# Patient Record
Sex: FEMALE | Race: OTHER | Employment: FULL TIME | URBAN - NONMETROPOLITAN AREA
[De-identification: names, ages, dates, MRNs, and addresses within clinical notes are randomized per-mention and may not be internally consistent; named-entity substitution may affect disease eponyms.]

---

## 2020-09-17 ENCOUNTER — HOSPITAL ENCOUNTER (EMERGENCY)
Age: 65
Discharge: HOME | End: 2020-09-17
Attending: FAMILY MEDICINE

## 2020-09-17 VITALS
DIASTOLIC BLOOD PRESSURE: 74 MMHG | HEART RATE: 72 BPM | SYSTOLIC BLOOD PRESSURE: 132 MMHG | RESPIRATION RATE: 16 BRPM | TEMPERATURE: 98.3 F | OXYGEN SATURATION: 99 %

## 2020-09-17 PROCEDURE — 90471 IMMUNIZATION ADMIN: CPT | Performed by: FAMILY MEDICINE

## 2020-09-17 PROCEDURE — 12001 RPR S/N/AX/GEN/TRNK 2.5CM/<: CPT

## 2020-09-17 PROCEDURE — 99282 EMERGENCY DEPT VISIT SF MDM: CPT

## 2020-09-17 PROCEDURE — 2500000003 HC RX 250 WO HCPCS

## 2020-09-17 PROCEDURE — 99283 EMERGENCY DEPT VISIT LOW MDM: CPT

## 2020-09-17 PROCEDURE — 2709999900 HC NON-CHARGEABLE SUPPLY

## 2020-09-17 PROCEDURE — 6360000002 HC RX W HCPCS: Performed by: FAMILY MEDICINE

## 2020-09-17 PROCEDURE — 90715 TDAP VACCINE 7 YRS/> IM: CPT | Performed by: FAMILY MEDICINE

## 2020-09-17 RX ORDER — LIDOCAINE HYDROCHLORIDE 10 MG/ML
5 INJECTION, SOLUTION EPIDURAL; INFILTRATION; INTRACAUDAL; PERINEURAL ONCE
Status: DISCONTINUED | OUTPATIENT
Start: 2020-09-17 | End: 2020-09-17 | Stop reason: HOSPADM

## 2020-09-17 RX ORDER — LIDOCAINE HYDROCHLORIDE 10 MG/ML
INJECTION, SOLUTION INFILTRATION; PERINEURAL
Status: COMPLETED
Start: 2020-09-17 | End: 2020-09-17

## 2020-09-17 RX ADMIN — LIDOCAINE HYDROCHLORIDE 5 ML: 10 INJECTION, SOLUTION INFILTRATION; PERINEURAL at 20:18

## 2020-09-17 RX ADMIN — TETANUS TOXOID, REDUCED DIPHTHERIA TOXOID AND ACELLULAR PERTUSSIS VACCINE, ADSORBED 0.5 ML: 5; 2.5; 8; 8; 2.5 SUSPENSION INTRAMUSCULAR at 20:17

## 2020-09-17 ASSESSMENT — PAIN SCALES - GENERAL: PAINLEVEL_OUTOF10: 0

## 2020-09-17 ASSESSMENT — ENCOUNTER SYMPTOMS
NAUSEA: 0
VOMITING: 0

## 2020-09-18 NOTE — ED NOTES
Tube gauze dressing applied to right 4th finger. Pt instructed on care and discharge instructions via  service. Pt verbalized understanding.       Kayleen Jane RN  09/17/20 2032

## 2020-09-18 NOTE — ED PROVIDER NOTES
[facility]  eMERGENCY dEPARTMENT eNCOUnter          [address] [facility]       Chief Complaint   Patient presents with    Laceration     right 4th finger       Nurses Notes reviewed and I agree except as noted in the HPI. HISTORY OF PRESENT ILLNESS    Kang Gonzalez is a 59 y.o. female who presents with cut to right ring finger. 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 Denies other injuries. Tetanus status unknown. REVIEW OF SYSTEMS     Review of Systems   Constitutional: Positive for activity change. Negative for chills and fever. Gastrointestinal: Negative for nausea and vomiting. Musculoskeletal: Negative for arthralgias and joint swelling. Skin: Positive for wound (right ring finger cut ). Hematological: Does not bruise/bleed easily. Psychiatric/Behavioral: Negative for agitation and behavioral problems. All other systems reviewed and are negative. PAST MEDICAL HISTORY    has no past medical history on file. SURGICAL HISTORY      has no past surgical history on file. CURRENT MEDICATIONS       Previous Medications    No medications on file       ALLERGIES     has No Known Allergies. FAMILY HISTORY     has no family status information on file. family history is not on file. SOCIAL HISTORY      reports that she has never smoked. She has never used smokeless tobacco. She reports that she does not drink alcohol or use drugs. PHYSICAL EXAM     INITIAL VITALS:  temperature is 98.3 °F (36.8 °C). Her blood pressure is 132/74 and her pulse is 72. Her respiration is 16 and oxygen saturation is 99%. Physical Exam  Vitals signs and nursing note reviewed. Constitutional:       General: She is not in acute distress. Musculoskeletal: Normal range of motion. General: Signs of injury present. Skin:     Capillary Refill: Capillary refill takes less than 2 seconds. Comments: Laceration right finger palmar aspect.  2 cm Neurological:      Mental Status: She is alert. Sensory: No sensory deficit. Psychiatric:         Mood and Affect: Mood normal.         Behavior: Behavior normal.         DIFFERENTIAL DIAGNOSIS:   Laceration right ring finger     DIAGNOSTIC RESULTS     EKG: All EKG's are interpreted by the Emergency Department Physician who either signs or Co-signs this chart in the absence of a cardiologist.      RADIOLOGY: non-plain film images(s) such as CT, Ultrasound and MRI are read by the radiologist.      LABS:   Labs Reviewed - No data to display    EMERGENCY DEPARTMENT COURSE:   Vitals:    Vitals:    09/17/20 1950   BP: 132/74   Pulse: 72   Resp: 16   Temp: 98.3 °F (36.8 °C)   SpO2: 99%     Tetanus updated. Patient is Turkmen-speaking. Was able to articulate need for stitches and she voiced understanding. CRITICAL CARE:       CONSULTS:      PROCEDURES:  Patient Name: Selina Hightower   Medical Record Number: [id number]  Date: 9/17/2020   Time: 8:17 PM   Room/Bed: 1/[facility]  Laceration Repair Procedure Note  Indication: Laceration    Procedure: The patient was placed in the appropriate position and anesthesia around the laceration was obtained by infiltration using 1% Lidocaine without epinephrine. The area was then cleansed using Shur-Clens. The laceration was closed with 4-0 Ethilon using interrupted sutures. There were no additional lacerations requiring repair. The wound area was then dressed with a bandage. Total repaired wound length: 2 cm. Other Items: Suture count: 7    The patient tolerated the procedure well. Complications: None    Electronically Signed by: @[email or web address]@    FINAL IMPRESSION      1. Laceration of right ring finger without foreign body without damage to nail, initial encounter          DISPOSITION/PLAN   Home. Care instructions provided. Watch for signs and symptoms of infection which may include redness,discharge,or fever.  Follow up in 7-10 days for suture removal.

## 2020-09-18 NOTE — ED NOTES
Pt and supervisor given discharge instructions. Both verbalized understanding and pt left ambulatory with supervisor. Pt in stable condition.       Comfort Foote RN  09/17/20 2040

## 2020-09-25 ENCOUNTER — HOSPITAL ENCOUNTER (OUTPATIENT)
Dept: GENERAL RADIOLOGY | Age: 65
Discharge: HOME | End: 2020-09-25
Payer: COMMERCIAL

## 2020-09-25 VITALS
SYSTOLIC BLOOD PRESSURE: 176 MMHG | DIASTOLIC BLOOD PRESSURE: 80 MMHG | RESPIRATION RATE: 15 BRPM | HEART RATE: 80 BPM | OXYGEN SATURATION: 97 % | TEMPERATURE: 98.3 F

## 2020-09-25 PROCEDURE — 2709999900 HC NON-CHARGEABLE SUPPLY

## 2020-09-25 ASSESSMENT — PAIN SCALES - GENERAL: PAINLEVEL_OUTOF10: 2

## 2020-09-25 ASSESSMENT — PAIN DESCRIPTION - ORIENTATION: ORIENTATION: RIGHT;OTHER (COMMENT)

## 2020-09-25 ASSESSMENT — PAIN DESCRIPTION - LOCATION: LOCATION: FINGER (COMMENT WHICH ONE)

## 2020-09-25 NOTE — PROGRESS NOTES
Pt presents amb per self for Wound recheck and suture removal.  Pt has 2.5 cm  Laceration legth of rt index finger. Suture line dry, intact.

## 2020-09-25 NOTE — PROGRESS NOTES
Dr Macias Butt in with pt and removed some of the stitches. Left rest in. Cleansed wit alcohol. Steri strips applied across area that stitches where removed. Covered with 2\" uri and tape.

## 2020-09-29 ENCOUNTER — HOSPITAL ENCOUNTER (OUTPATIENT)
Dept: GENERAL RADIOLOGY | Age: 65
Discharge: HOME | End: 2020-09-29

## 2020-09-29 VITALS
HEART RATE: 66 BPM | DIASTOLIC BLOOD PRESSURE: 98 MMHG | TEMPERATURE: 98.1 F | RESPIRATION RATE: 16 BRPM | SYSTOLIC BLOOD PRESSURE: 161 MMHG | OXYGEN SATURATION: 98 %

## 2020-09-29 PROCEDURE — 2709999900 HC NON-CHARGEABLE SUPPLY

## 2020-09-29 PROCEDURE — 99202 OFFICE O/P NEW SF 15 MIN: CPT

## 2020-09-29 PROCEDURE — 6370000000 HC RX 637 (ALT 250 FOR IP)

## 2020-09-29 RX ORDER — BACITRACIN, NEOMYCIN, POLYMYXIN B 400; 3.5; 5 [USP'U]/G; MG/G; [USP'U]/G
OINTMENT TOPICAL
Status: COMPLETED
Start: 2020-09-29 | End: 2020-09-29

## 2020-09-29 RX ADMIN — Medication 3 ML: at 12:22

## 2020-09-29 RX ADMIN — BACITRACIN, NEOMYCIN, POLYMYXIN B 1 G: 400; 3.5; 5 OINTMENT TOPICAL at 12:57

## 2020-09-29 NOTE — PROGRESS NOTES
in room to remove sutures. Language bank on I pad. Pt screams and complains of severe pain when trying to get sutures out. Pt advised per  that we will apply topical ointment for pain.

## 2020-09-29 NOTE — PROGRESS NOTES
Pt presents amb per self for suture removal from injury on sept 17th. Unable to take sutures out on sept 25. Was gapping open. Today pt had tape around digit. Skin  Is white and damp. Sutures intact. Seri strips came off with tape. Tip of digit pink and blanches briskly. No drainage noted.

## 2020-09-29 NOTE — PROGRESS NOTES
Antibiotic ointment and bandaid applied. Respirations regular and quiet. Nonlabored. Alert and oriented times 3. No nausea or vomiting. Skin pink, warm and dry. Calm and cooperative. Denies any pain. Pt released ambulatory in satisfactory condition per self.

## 2020-09-30 NOTE — PROGRESS NOTES
[address] [facility]  Phone: [phone] [facility] COMPENSATION RECHECK ENCOUNTER      CHIEF COMPLAINT    Right index finger laceration    HPI    Bossman Mccormick is a 59 y.o. female who presents for recheck Workman's compensation of right index finger laceration. Patient has a healing wound that was sustained on 9/17/2020. She did have some suture loss and wound dehiscence which required placement of Steri-Strips. Patient has been taping wound. No numbness or tingling. No pain. No drainage. No surrounding erythema. PAST MEDICAL HISTORY    No past medical history on file. SURGICAL HISTORY    No past surgical history on file. CURRENT MEDICATIONS    Reviewed    ALLERGIES    No Known Allergies    FAMILY HISTORY    No family history on file.     SOCIAL HISTORY    Social History     Socioeconomic History    Marital status: Single     Spouse name: Not on file    Number of children: Not on file    Years of education: Not on file    Highest education level: Not on file   Occupational History    Not on file   Social Needs    Financial resource strain: Not on file    Food insecurity     Worry: Not on file     Inability: Not on file    Transportation needs     Medical: Not on file     Non-medical: Not on file   Tobacco Use    Smoking status: Never Smoker    Smokeless tobacco: Never Used   Substance and Sexual Activity    Alcohol use: Never    Drug use: Never    Sexual activity: Never   Lifestyle    Physical activity     Days per week: Not on file     Minutes per session: Not on file    Stress: Not on file   Relationships    Social connections     Talks on phone: Not on file     Gets together: Not on file     Attends Zoroastrianism service: Not on file     Active member of club or organization: Not on file     Attends meetings of clubs or organizations: Not on file     Relationship status: Not on file    Intimate partner violence     Fear of current or ex partner: Not on file     Emotionally abused: Not on file     Physically abused: Not on file     Forced sexual activity: Not on file   Other Topics Concern    Not on file   Social History Narrative    Not on file       REVIEW OF SYSTEMS      All systems negative except as marked. PHYSICAL EXAM    VITAL SIGNS: BP (!) 161/98   Pulse 66   Temp 98.1 °F (36.7 °C) (Temporal)   Resp 16   SpO2 98%    Constitutional:  Alert not toxtic or ill, no acute distress  HENT:  Normocephalic, Atraumatic,  Cervical Spine: Normal range of motion,  No stridor. Eyes:  No discharge or  Swelling,  Respiratory: No respiratory distress,  Cardiovascular:  Normal heart rate, normal rhythm. No murmurs. GI:  No reproducible pain, normal active bowel sounds   musculoskeletal:  Intact distal pulses, normal range of motion  Back:No tenderness. Integument:  Warm, Dry, No erythema, No rash (on exposed areas). Volar aspect of right index finger has a healing wound. There is some wound dehiscence. 3 sutures remain in place at this time. No surrounding erythema or drainage. Neurologic:  Alert & oriented x 3, Normal motor function, Normal sensory function, No focal deficits noted. Psychiatric:  Affect normal,       RADIOLOGY    No orders to display       PROCEDURES    Skin was cleansed with alcohol pad on right index finger. 3 sutures removed using sterile scissors and forceps. LET was applied prior to removal of the last suture as patient was noting discomfort secondary to overgrowth of skin. ASSESSMENT and DECISION MAKING  Patient instructed to apply triple antibiotic ointment twice daily to healing wound on right index finger. Discharged home in stable condition with recommended follow-up with occupational health for wound check.     CONSULTS:  None        Diagnosis:  Same as initial ED evaluation      Restrictions:  Same as initial evaluation      PATIENT REFERRED TO:      DISCHARGE MEDICATIONS:  None